# Patient Record
Sex: MALE | Race: WHITE | NOT HISPANIC OR LATINO | Employment: OTHER | URBAN - METROPOLITAN AREA
[De-identification: names, ages, dates, MRNs, and addresses within clinical notes are randomized per-mention and may not be internally consistent; named-entity substitution may affect disease eponyms.]

---

## 2018-08-24 ENCOUNTER — OFFICE VISIT (OUTPATIENT)
Dept: URGENT CARE | Facility: PHYSICIAN GROUP | Age: 53
End: 2018-08-24
Payer: COMMERCIAL

## 2018-08-24 VITALS
HEART RATE: 101 BPM | WEIGHT: 163 LBS | SYSTOLIC BLOOD PRESSURE: 114 MMHG | BODY MASS INDEX: 27.83 KG/M2 | OXYGEN SATURATION: 95 % | TEMPERATURE: 98.8 F | DIASTOLIC BLOOD PRESSURE: 70 MMHG | HEIGHT: 64 IN

## 2018-08-24 DIAGNOSIS — R31.0 GROSS HEMATURIA: ICD-10-CM

## 2018-08-24 DIAGNOSIS — R39.89 SUSPECTED UTI: ICD-10-CM

## 2018-08-24 DIAGNOSIS — R39.15 URINARY URGENCY: ICD-10-CM

## 2018-08-24 LAB
APPEARANCE UR: NORMAL
BILIRUB UR STRIP-MCNC: NEGATIVE MG/DL
COLOR UR AUTO: NORMAL
GLUCOSE UR STRIP.AUTO-MCNC: NEGATIVE MG/DL
KETONES UR STRIP.AUTO-MCNC: NEGATIVE MG/DL
LEUKOCYTE ESTERASE UR QL STRIP.AUTO: NEGATIVE
NITRITE UR QL STRIP.AUTO: NEGATIVE
PH UR STRIP.AUTO: 5.5 [PH] (ref 5–8)
PROT UR QL STRIP: 30 MG/DL
RBC UR QL AUTO: NORMAL
SP GR UR STRIP.AUTO: 1.02
UROBILINOGEN UR STRIP-MCNC: 0.2 MG/DL

## 2018-08-24 PROCEDURE — 99203 OFFICE O/P NEW LOW 30 MIN: CPT | Performed by: PHYSICIAN ASSISTANT

## 2018-08-24 PROCEDURE — 81002 URINALYSIS NONAUTO W/O SCOPE: CPT | Performed by: PHYSICIAN ASSISTANT

## 2018-08-24 RX ORDER — SULFAMETHOXAZOLE AND TRIMETHOPRIM 800; 160 MG/1; MG/1
1 TABLET ORAL EVERY 12 HOURS
Qty: 20 TAB | Refills: 0 | Status: SHIPPED | OUTPATIENT
Start: 2018-08-24 | End: 2018-09-03

## 2018-08-24 RX ORDER — LISINOPRIL 20 MG/1
20 TABLET ORAL DAILY
COMMUNITY

## 2018-08-24 RX ORDER — ATORVASTATIN CALCIUM 10 MG/1
10 TABLET, FILM COATED ORAL NIGHTLY
COMMUNITY

## 2018-08-24 ASSESSMENT — ENCOUNTER SYMPTOMS
FEVER: 0
BACK PAIN: 0
VISUAL CHANGE: 0
COUGH: 0
SORE THROAT: 0
NAUSEA: 0
FLANK PAIN: 0
CHANGE IN BOWEL HABIT: 0
EYE REDNESS: 0
WHEEZING: 0
ABDOMINAL PAIN: 0
HEADACHES: 0
EYE DISCHARGE: 0
NECK PAIN: 0
ROS GI COMMENTS: SUPRAPUBIC PRESSURE
MYALGIAS: 0
VOMITING: 0
CHILLS: 1

## 2018-08-25 NOTE — PROGRESS NOTES
Subjective:      [place] is a 53 y.o. male who presents with UTI (frequency, urgency, blood in urine, denies nausea or back pain)            Patient is a 53-year-old male who presents today with urinary urgency and frequency with notable blood in his urine since yesterday.  Patient reports in clinic today he did notice some burning up on initiation of his stream however reports this happened only today.  Patient does report recent history of hematuria with treatment for other UTIs in recent past.  Patient denies any new penile discharge or new sexual partners.  Patient also denies history of smoking or family history of bladder cancer.  Patient currently denies any back pain, abdominal pain, fevers or flank pain.  Patient does past medical history of kidney stone of which this happened once and was extremely uncomfortable-patient denies any of the symptoms at this time.      UTI   This is a new problem. The current episode started yesterday. The problem occurs constantly. The problem has been unchanged. Associated symptoms include chills and urinary symptoms. Pertinent negatives include no abdominal pain, change in bowel habit, congestion, coughing, fever, headaches, myalgias, nausea, neck pain, rash, sore throat, visual change or vomiting. Nothing aggravates the symptoms. He has tried nothing for the symptoms.       Review of Systems   Constitutional: Positive for chills. Negative for fever and malaise/fatigue.   HENT: Negative for congestion and sore throat.    Eyes: Negative for discharge and redness.   Respiratory: Negative for cough and wheezing.    Gastrointestinal: Negative for abdominal pain, change in bowel habit, nausea and vomiting.        Suprapubic pressure   Genitourinary: Positive for dysuria, frequency, hematuria and urgency. Negative for flank pain.   Musculoskeletal: Negative for back pain, myalgias and neck pain.   Skin: Negative for itching and rash.   Neurological: Negative for  "headaches.   All other systems reviewed and are negative.         Objective:     /70   Pulse (!) 101   Temp 37.1 °C (98.8 °F)   Ht 1.626 m (5' 4\")   Wt 73.9 kg (163 lb)   SpO2 95%   BMI 27.98 kg/m²    PMH:  has no past medical history on file.  MEDS:   Current Outpatient Prescriptions:   •  lisinopril (PRINIVIL) 20 MG Tab, Take 20 mg by mouth every day., Disp: , Rfl:   •  atorvastatin (LIPITOR) 10 MG Tab, Take 10 mg by mouth every evening., Disp: , Rfl:   •  sulfamethoxazole-trimethoprim (BACTRIM DS) 800-160 MG tablet, Take 1 Tab by mouth every 12 hours for 10 days., Disp: 20 Tab, Rfl: 0  ALLERGIES: Not on File  SURGHX: History reviewed. No pertinent surgical history.  SOCHX:  reports that he has never smoked. His smokeless tobacco use includes Chew.  FH: Family history was reviewed, no pertinent findings to report    Physical Exam   Constitutional: He is oriented to person, place, and time. He appears well-developed.   HENT:   Head: Normocephalic and atraumatic.   Eyes: Pupils are equal, round, and reactive to light. EOM are normal.   Neck: Normal range of motion. Neck supple.   Cardiovascular: Normal rate and regular rhythm.    No murmur heard.  Pulmonary/Chest: Effort normal and breath sounds normal. No respiratory distress.   Abdominal: Soft. Bowel sounds are normal. He exhibits no distension.   Minimal suprapubic tenderness. Negative CVAT.    Musculoskeletal: Normal range of motion. He exhibits no edema.   Neurological: He is alert and oriented to person, place, and time.   Skin: Skin is warm and dry.   Psychiatric: He has a normal mood and affect. His behavior is normal.   Vitals reviewed.              POC Color shelby  Negative Final   POC Appearance turbid  Negative Final   POC Leukocyte Esterase negative  Negative Final   POC Nitrites negative  Negative Final   POC Urobiligen 0.2  Negative (0.2) mg/dL Final   POC Protein 30  Negative mg/dL Final   POC Urine PH 5.5  5.0 - 8.0 Final   POC Blood " LARGE  Negative Final   POC Specific Gravity 1.025  <1.005 - >1.030 Final   POC Ketones negative  Negative mg/dL Final   POC Bilirubin negative  Negative mg/dL Final   POC Glucose negative  Negative mg/dL        Assessment/Plan:     1. Suspected UTI  - sulfamethoxazole-trimethoprim (BACTRIM DS) 800-160 MG tablet; Take 1 Tab by mouth every 12 hours for 10 days.  Dispense: 20 Tab; Refill: 0    2. Gross hematuria  - POCT Urinalysis  - URINE CULTURE(NEW); Future    3. Urinary urgency  - POCT Urinalysis  - URINE CULTURE(NEW); Future    At this time patient is currently traveling from [place] which based on symptoms patient appears to have suspected UTI.  I will send off for culture at this time given patient's slight dysuria, urinary urgency and frequency with noted hematuria.  I did however strongly encourage patient to follow-up with urology once he returns to [place] as patient may need further cystoscopy recent hematuria-in particular if urine culture is negative at this time.  Patient understands and agrees with the plan to seek further care with urology for further investigation for recurrent UTI and hematuria.  Patient given precautionary s/sx that mandate immediate follow up and evaluation in the ED. Advised of risks of not doing so.    DDX, Supportive care, and indications for immediate follow-up discussed with patient.    Instructed to return to clinic or nearest emergency department if we are not available for any change in condition, further concerns, or worsening of symptoms.    The patient demonstrated a good understanding and agreed with the treatment plan.  Please note that this dictation was created using voice recognition software. I have made every reasonable attempt to correct obvious errors, but I expect that there are errors of grammar and possibly content that I did not discover before finalizing the note.